# Patient Record
Sex: MALE | Race: WHITE | Employment: FULL TIME | URBAN - METROPOLITAN AREA
[De-identification: names, ages, dates, MRNs, and addresses within clinical notes are randomized per-mention and may not be internally consistent; named-entity substitution may affect disease eponyms.]

---

## 2021-06-02 ENCOUNTER — HOSPITAL ENCOUNTER (OUTPATIENT)
Dept: PHYSICAL THERAPY | Age: 56
Discharge: HOME | End: 2021-06-02
Payer: COMMERCIAL

## 2021-06-02 PROCEDURE — 97162 PT EVAL MOD COMPLEX 30 MIN: CPT | Performed by: PHYSICAL THERAPIST

## 2021-06-02 PROCEDURE — 97530 THERAPEUTIC ACTIVITIES: CPT | Performed by: PHYSICAL THERAPIST

## 2021-06-02 PROCEDURE — 97110 THERAPEUTIC EXERCISES: CPT | Performed by: PHYSICAL THERAPIST

## 2021-06-02 NOTE — PROGRESS NOTES
PT DAILY TREATMENT NOTE/SHOULDER EVAL     Patient Name: Anusha Michaud  Date:2021  : 1965  [x]  Patient  Verified  Payor: [address] [place] [address] Road / Plan: "[employer]re / Product Type: Workers Comp /    In [address]  Total Treatment Time (min): 53  Visit #: 1 of 12    Medicare/BCBS Only   Total Timed Codes (min):  20 1:1 Treatment Time:  53       Treatment Area: No admission diagnoses are documented for this encounter. SUBJECTIVE  Pain Level (0-10 scale): 1 ache occasional throb   []constant []intermittent []improving []worsening []no change since onset    Any medication changes, allergies to medications, adverse drug reactions, diagnosis change, or new procedure performed?: [x] No    [] Yes (see summary sheet for update)  Subjective functional status/changes:     PLOF: no limitations with reaching overhead and with sleeping through night   Limitations to PLOF: pain wakes him up at night couple times a week , pain with overhead activities and reaching behind him . Mechanism of Injury: left shoulder pain s/p fell on ice 2021 on sidewalk , did not hit head fell onto left shoulder then hip knee ( hip knee better but shoulder persists - mil better ) shoulder wakes him up with pain not every night    Current symptoms/Complaints: pain in left shoulder   Other movements reach behind or above   And mild CBA , no instability feel , arm falls asleep when lying down at times     Previous Treatment/Compliance: no therapy in past   PMHx/Surgical Hx: high blood pressure , no surgery in past except hernia   Work Hx: desk job , meeting , travel   Living Situation: lives with wife   Pt Goals: sleep through night , make sure nothing else is wrong .    Barriers: []pain []financial []time []transportation []other  Motivation: good   Substance use: []Alcohol []Tobacco []other:   FABQ Score: []low []elevate  Cognition: A & O x 4    Other:    OBJECTIVE/EXAMINATION  Domestic Life: safe at home Activity/Recreational Limitations: difficult to sleep , might do less reach overhead and behind     Mobility: generally good   Self Care: indep         33 min [x]Eval                  []Re-Eval       12 min Therapeutic Exercise:  [x] See flow sheet :   Rationale: increase ROM, increase strength, improve coordination and increase proprioception to improve the patients ability to PLOF     8 min Therapeutic Activity:  [x]  See flow sheet : discussed findings of eval , answered questions , discussed taught posture and connection between shoulder and thoracic movement    Rationale: increase ROM, increase strength and improve coordination  to improve the patients ability to return to PLOF            With   [] TE   [] TA   [] neuro   [] other: Patient Education: [x] Review HEP    [] Progressed/Changed HEP based on:   [] positioning   [] body mechanics   [] transfers   [] heat/ice application    [] other:      Other Objective/Functional Measures: FOTO : 65/100     Physical Therapy Evaluation - Shoulder    Posture: [] Poor    [x] Fair    [] Good    Describe: = inferior angle , = arm space , mild forward shoulder head posture      ttp over RTC insert , no other tenderness noted , no pain over bicep tendon . ROM & strenght :  [] Unable to assess at this time                                           AROM                                    Strength    Left Right  Left Right   Flexion 141 +pain  165 Flexion  3+ to 4- 5   Extension 68 end range pain  78 Extension 5 5   Scaption/ End range pain   180 Scaptin/ABD 4- 4+   ER @ 0 Degrees Reach mild tighter   ER @ 0 Degrees 4 4+   ER @ 90 Degrees   ADD 5 5   IR @ 90 Degrees T9  No sig pain T8 IR @ 90 Degrees 5 5     End Feel / Painful Arc:end range pain       Scapulohumoral Control / Rhythm:  Able to eccentrically lower with good control?  Left: [x] Yes   [] No     Right: [x] Yes   [] No    Accessory Motions: no     Optional Tests:  Reflexes and sensation  UE WNL bilaterally     Positive orozco reji and CBA with elevation on left  + pain ,  + neer test     NEGative wilkins + impingement , neg for labrum (  Not deep in joint and worse with thumb up )     Neg for dynamic labral sheer , neg for bicep load test     No instability bilateral shoulders/ neg apprehension ,  Positive  tight inferior capsule bilaterally   Very tight IR bilaterally pt supine tested in 90 deg abd     Mild forward head and shoulder posture     Other Tests / Comments: HEP performed   Repeated mobility ex with TMR ( total motion release )   Flexion increase to 165 deg still end range pain on left following repeated flexion 2 sets 10 x 5 sec on right / after switching to 3x 20 sec hold but only mild increase to 167 deg on right following       167 deg left shoulder abd following flexion repeated motions, following abd on right shoulder 10x5 sec noted left now 173 deg abd     Sitting trunk rotation left rotation only mild stiffer and some discomfort in shoulder on return   Pt performed        Discussed posture and eval findings     Pain Level (0-10 scale) post treatment: 1 less tightness     ASSESSMENT/Changes in Function: Patient is a 60 y.o.male who presents to PT with Left shoulder pain [M25.512] s/p fall on to left shoulder. Today noting + impingement tests with kallie Beverly . Neg for labral test with dynamic labral sheer , bicep load and wilkins. + Positive  weakness left shoulder with some pain with resist. Decrease ROM left UE compared to right . Neg drop arm. TTP over RTC insert only. No instability mild tight inferior glide and + IR tightness at 90 deg. The patient will benefit from skilled PT services to address these deficits and facilitate return to premorbid activity level and improved quality of life.     Patient will continue to benefit from skilled PT services to modify and progress therapeutic interventions, address functional mobility deficits, address ROM deficits, address strength deficits, analyze and address soft tissue restrictions, analyze and cue movement patterns, analyze and modify body mechanics/ergonomics, assess and modify postural abnormalities and address imbalance/dizziness to attain remaining goals. [x]  See Plan of Care  []  See progress note/recertification  []  See Discharge Summary         Progress towards goals / Updated goals:  Short Term Goals: To be accomplished in 4 weeks:   Patient will report compliance with initial/basic HEP at least 1x/day to aid in rehabilitation program.   Status at IE: Patient instructed in and provided written copy of initial Home Exercise Program.   Current: Same as IE     Patient will display full left shoulder pain free AROM into flexion,extension  and abduction to aid in completion of ADLs. Status at IE:    Left Right   Flexion 141 +pain  165   Extension 68 end range pain  78   Scaption/ End range pain   180      Current: Same as IE      Long Term Goals: To be accomplished in 8 weeks:   Patient will report compliance with comprehensive HEP a least 3-4x/week to aid in rehabilitation/strengthening program.   Status at IE: Patient instructed in and provided written copy of initial Home Exercise Program.   Current: Same as IE     Patient will report no pain greater than 1-2/10 with overhead activities to aid in completion of ADLs and able to sleep through night without shoulder pain waking him up    Status at IE: 4/10   Current: Same as IE     Patient will increase bilateral   UE strength to 5/5 throughout all planes to aid in completion of ADLs. Status at IE:   Left Right   Flexion  3+ to 4- 5   Extension 5 5   Scaptin/ABD 4- 4+   ER @ 0 Degrees 4 4+   ADD 5 5   IR @ 90 Degrees 5 5       Current: Same as IE     Patient will increase FOTO (an established functional score where 100 = no disability) score to 69 points overall to demonstrate improvement in functional status.     Status at IE: 72   Current: Same as IE      PLAN  [x]  Upgrade activities as tolerated     [x]  Continue plan of care  []  Update interventions per flow sheet       []  Discharge due to:_  []  Other:_      David Kelly, PT 6/2/2021  2:06 PM

## 2021-06-02 NOTE — PROGRESS NOTES
[facility] at [address] [place] [address]  Phone: [phone]   Fax: [phone]    Plan of Care/ Statement of Necessity for Physical Therapy Services     Patient name: Kristi Xiong Start of Care: 2021   Referral source: [facility], Not On File, NP : 1965    Medical Diagnosis: Left shoulder pain [M25.512]   Onset Date:2021     Treatment Diagnosis: left shoulder pain    Prior Hospitalization: see medical history Provider#: [id number]   Medications: Verified on Patient summary List    Comorbidities: high blood pressure    Prior Level of Function: no limitations in reaching behind or overhead , able to sleep through night without shoulder pain waking him up . The Plan of Care and following information is based on the information from the initial evaluation. Assessment/ key information: a 60 y.o.male who presents to PT with Left shoulder pain [M25.512] s/p fall on to left shoulder. Today noting + impingement tests with kallie Medina . Neg for labral test with dynamic labral sheer , bicep load and wilkins. + Positive  weakness left shoulder with some pain with resist. Decrease ROM left UE compared to right . Neg drop arm. TTP over RTC insert only. No instability mild tight inferior glide and + IR tightness at 90 deg. The patient will benefit from skilled PT services to address these deficits and facilitate return to premorbid activity level and improved quality of life.     Evaluation Complexity History MEDIUM  Complexity : 1-2 comorbidities / personal factors will impact the outcome/ POC ; Examination HIGH Complexity : 4+ Standardized tests and measures addressing body structure, function, activity limitation and / or participation in recreation  ;Presentation MEDIUM Complexity : Evolving with changing characteristics  ; Clinical Decision Making MEDIUM Complexity : FOTO score of 26-74  Overall Complexity Rating: MEDIUM  Problem List: pain affecting function, decrease ROM, decrease strength, decrease activity tolerance and decrease flexibility/ joint mobility   Treatment Plan may include any combination of the following: Therapeutic exercise, Therapeutic activities, Neuromuscular re-education, Physical agent/modality, Manual therapy, Aquatic therapy and Patient education  Patient / Family readiness to learn indicated by: asking questions, trying to perform skills and interest  Persons(s) to be included in education: patient (P)  Barriers to Learning/Limitations: None  Measures taken if barriers to learning: NA  Patient Goal (s): sleep through the night , address root problem.   Patient Self Reported Health Status: good  Rehabilitation Potential: excellent  Short Term Goals: To be accomplished in 4 weeks:                   Patient will report compliance with initial/basic HEP at least 1x/day to aid in rehabilitation program.                   Status at IE: Patient instructed in and provided written copy of initial Home Exercise Program.                   Current: Same as IE                      Patient will display full left shoulder pain free AROM into flexion,extension  and abduction to aid in completion of ADLs. Status at IE:     Left Right   Flexion 141 +pain  165   Extension 68 end range pain  78   Scaption/ End range pain   180                       Current: Same as IE        Long Term Goals:  To be accomplished in 8 weeks:                   Patient will report compliance with comprehensive HEP a least 3-4x/week to aid in rehabilitation/strengthening program.                   Status at IE: Patient instructed in and provided written copy of initial Home Exercise Program.                   Current: Same as IE                      Patient will report no pain greater than 1-2/10 with overhead activities to aid in completion of ADLs and able to sleep through night without shoulder pain waking him up                    Status at IE: 4/10                   Current: Same as IE                      Patient will increase bilateral   UE strength to 5/5 throughout all planes to aid in completion of ADLs. Status at IE:    Left Right   Flexion  3+ to 4- 5   Extension 5 5   Scaptin/ABD 4- 4+   ER @ 0 Degrees 4 4+   ADD 5 5   IR @ 90 Degrees 5 5                       Current: Same as IE                      Patient will increase FOTO (an established functional score where 100 = no disability) score to 69 points overall to demonstrate improvement in functional status. Status at IE: 72                   Current: Same as IE     Frequency / Duration: Patient to be seen 2 times per week for 6 weeks. Patient/ Caregiver education and instruction: Diagnosis, prognosis, self care, activity modification and exercises   [x]  Plan of care has been reviewed with HOA Kelly, PT 6/2/2021 3:19 PM  _____________________________________________________________________  I certify that the above Therapy Services are being furnished while the patient is under my care. I agree with the treatment plan and certify that this therapy is necessary.     [de-identified] Signature:____________Date:_________TIME:________                                      Rowena Legacy, Not On File, NP    ** Signature, Date and Time must be completed for valid certification **    Please sign and return to [facility] at [address]  Phone: [phone]   Fax: [phone]

## 2021-06-04 ENCOUNTER — HOSPITAL ENCOUNTER (OUTPATIENT)
Dept: PHYSICAL THERAPY | Age: 56
Discharge: HOME | End: 2021-06-04
Payer: COMMERCIAL

## 2021-06-04 PROCEDURE — 97110 THERAPEUTIC EXERCISES: CPT | Performed by: PHYSICAL THERAPIST

## 2021-06-04 NOTE — PROGRESS NOTES
PT DAILY TREATMENT NOTE    Patient Name: Kori Marrero  WXVI:3/9/2176  : 1965  [x]  Patient  Verified  Payor: [address] [place] [address] Road / Plan: [facility] / Product Type: Workers Comp /    In [employer] time:0850  Total Treatment Time (min): 50  Total Timed Codes (min): 50     Visit #: 2 of 12    Treatment Area: Left shoulder pain [M25.512]    SUBJECTIVE  Pain Level (0-10 scale): 1sore only   Any medication changes, allergies to medications, adverse drug reactions, diagnosis change, or new procedure performed?: [x] No    [] Yes (see summary sheet for update)  Subjective functional status/changes:   [x] No changes reported      OBJECTIVE  45 min Therapeutic Exercise:  [x]? See flow sheet :   Rationale: increase ROM, increase strength, improve coordination and increase proprioception to improve the patients ability to PLOF        5 min Manual Therapy:  pec minor release    Rationale: decrease pain, increase ROM, increase tissue extensibility, decrease trigger points and increase postural awareness to improve the patients pain free functional mobility   The manual therapy interventions were performed at a separate and distinct time from the therapeutic activities interventions.           With   [] TE   [] TA   [] neuro   [] other: Patient Education: [x] Review HEP    [] Progressed/Changed HEP based on:   [] positioning   [] body mechanics   [] transfers   [] heat/ice application    [] other:      Other Objective/Functional Measures:   Flexion 160 deg left end range pain , right 170 deg   Pt performed repeated mobility with non painful side then recheck affect on opp side 1x 10 reps then 6 x 20 sec hold  ROM improved to 167 deg but still pop on return     abd 173 deg pain up and on return left , 180 right no pain 6x20 sec holds on right then recheck left noted increase ROM to 176 but still pain both directions     Trunk rotation right 62 deg , left 52 deg but feels = to pt no pain, performed 10 x 5 reps to right then recheck left ROM     Manual : pec minor release pt noted less return pain in flexion after     Pain Level (0-10 scale) post treatment: 0    ASSESSMENT/Changes in Function: tolerated ex well , cues for posture , pt has started repeated mobility ex with total motion release concept  with HEP since eval and today noted increase ROM in left shoulder since eval . No adverse affect with therapy . Patient will continue to benefit from skilled PT services to modify and progress therapeutic interventions, address functional mobility deficits, address ROM deficits, address strength deficits, analyze and address soft tissue restrictions, analyze and cue movement patterns, analyze and modify body mechanics/ergonomics and assess and modify postural abnormalities to attain remaining goals. [x]  See Plan of Care  []  See progress note/recertification  []  See Discharge Summary         Progress towards goals / Updated goals:  Short Term Goals: To be accomplished in 4 weeks:                   SANTI will report compliance with initial/basic HEP at least 1x/day to aid in rehabilitation program.                   Status at IE: Patient instructed in and provided written copy of initial Home Exercise Program.                   Current: reviewed ex given and added ex to program 6/4/2021                       Patient will display full left shoulder pain free AROM into flexion,extension  and abduction to aid in completion of ADLs.                  Status at IE:     Left Right   Flexion 141 +pain  165   Extension 68 end range pain  78   Scaption/ End range pain   180                       Current:  Left shoulder  flexion pain on return and 173 abd some pain with both directions.  , progressing 6/4/2021          Long Term Goals: To be accomplished in 8 weeks:                   RAFAEL will report compliance with comprehensive HEP a least 3-4x/week to aid in rehabilitation/strengthening program.                   Status at IE: Patient instructed in and provided written copy of initial Home Exercise Program.                   Current: Same as IE                      Patient will report no pain greater than 1-2/10 with overhead activities to aid in completion of ADLs and able to sleep through night without shoulder pain waking him up                    Status at IE: 4/10                   Current: Same as IE                      Patient will increase bilateral   UE strength to 5/5 throughout all planes to aid in completion of ADLs.                  Status at IE:    Left Right   Flexion  3+ to 4- 5   Extension 5 5   Scaptin/ABD 4- 4+   ER @ 0 Degrees 4 4+   ADD 5 5   IR @ 90 Degrees 5 5                       Current: Same as IE                      KHHNJOQ will increase FOTO (an established functional score where 100 = no disability) score to 69 points overall to demonstrate improvement in functional status.                     Status at IE: 65                   Current: Same as IE      PLAN  [x]  Upgrade activities as tolerated     [x]  Continue plan of care  []  Update interventions per flow sheet       []  Discharge due to:_  []  Other:_      Tameka Joya PT 6/4/2021  8:01 AM    Future Appointments   Date Time Provider Eugenio Wallace   6/8/2021  9:30 AM JULY Montaño [facility]   6/10/2021  8:00 AM JULY Edge [facility]   6/15/2021 11:00 AM JULY Montaño [facility]   6/17/2021  2:30 PM JULY Montaño [facility]

## 2021-06-08 ENCOUNTER — HOSPITAL ENCOUNTER (OUTPATIENT)
Dept: PHYSICAL THERAPY | Age: 56
Discharge: HOME | End: 2021-06-08
Payer: COMMERCIAL

## 2021-06-08 PROCEDURE — 97530 THERAPEUTIC ACTIVITIES: CPT

## 2021-06-08 PROCEDURE — 97110 THERAPEUTIC EXERCISES: CPT

## 2021-06-08 NOTE — PROGRESS NOTES
PT DAILY TREATMENT NOTE   Patient Name: Claire Mccarthy  Date:2021  : 1965  [x]  Patient  Verified  Payor: [facility] / Plan: [employer] / Product Type: Workers Comp /    In [place] time:1015  Total Treatment Time (min): 46  Total Timed Codes (min): 46   1:1 Treatment Time ( only): 55   Visit #: 3 of 12    Treatment Area: Left shoulder pain [M25.512]    SUBJECTIVE  Pain Level (0-10 scale): 0  Any medication changes, allergies to medications, adverse drug reactions, diagnosis change, or new procedure performed?: [x] No    [] Yes (see summary sheet for update)  Subjective functional status/changes:   [] No changes reported    Patient reports that he is compliant with initial HEP. OBJECTIVE    35 min Therapeutic Exercise:  [x] See flow sheet :   Rationale: increase ROM, increase strength and decrease pain to improve the patients ability to complete ADLs    11 min Therapeutic Activity:  [x]  See flow sheet :   Rationale: increase ROM, increase strength and improve coordination  to improve the patients ability to complete ADLs         With   [x] TE   [] TA   [] neuro   [] other: Patient Education: [x] Review HEP    [] Progressed/Changed HEP based on:   [] positioning   [] body mechanics   [] transfers   [] heat/ice application    [] other:      Other Objective/Functional Measures: NA     Pain Level (0-10 scale) post treatment: 0    ASSESSMENT/Changes in Function: Patient responds well to treatment session. Patient responded well to [facility] press and bicep curl reporting decreased tightness with shoulder extension. He also reported a reduction in symptoms with cervical retraction. Will advance HEP next visit.  No adverse effects were noted from today's treatment session    Patient will continue to benefit from skilled PT services to modify and progress therapeutic interventions, address functional mobility deficits, address ROM deficits, address strength deficits, analyze and address soft tissue restrictions, analyze and cue movement patterns, analyze and modify body mechanics/ergonomics, assess and modify postural abnormalities, and instruct in home and community integration to attain remaining goals. []  See Plan of Care  []  See progress note/recertification  []  See Discharge Summary         Progress towards goals / Updated goals:  Short Term Goals: To be accomplished in 4 weeks:                   KTURRGK will report compliance with initial/basic HEP at least 1x/day to aid in rehabilitation program.                   Status at IE: Patient instructed in and provided written copy of initial Home Exercise Program.                   WGXEMAD:MFLAOAIC HEP, 6/8/2021                      Patient will display full left shoulder pain free AROM into flexion,extension  and abduction to aid in completion of ADLs.                  Status at IE:     Left Right   Flexion 141 +pain  165   Extension 68 end range pain  78   Scaption/ End range pain   180                       Current:  Left shoulder  flexion pain on return and 173 abd some pain with both directions. , progressing 6/4/2021          Long Term Goals: To be accomplished in 8 weeks:                   Patient will report compliance with comprehensive HEP a least 3-4x/week to aid in rehabilitation/strengthening program.                   Status at IE: Patient instructed in and provided written copy of initial Home Exercise Program.                   Current: Same as IE                      Patient will report no pain greater than 1-2/10 with overhead activities to aid in completion of ADLs and able to sleep through night without shoulder pain waking him up                    Status at IE: 4/10                   Current: Same as IE                      Patient will increase bilateral   UE strength to 5/5 throughout all planes to aid in completion of ADLs.                    Status at IE:    Left Right   Flexion  3+ to 4- 5   Extension 5 5 Scaptin/ABD 4- 4+   ER @ 0 Degrees 4 4+   ADD 5 5   IR @ 90 Degrees 5 5                       Current: Same as IE                      KGPEOBU will increase FOTO (an established functional score where 100 = no disability) score to 69 points overall to demonstrate improvement in functional status.                     Status at IE: 65                   Current: Same as IE    PLAN  []  Upgrade activities as tolerated     [x]  Continue plan of care  []  Update interventions per flow sheet       []  Discharge due to:_  []  Other:_      Easton Romano, PT, DPT 6/8/2021  9:51 AM    Future Appointments   Date Time Provider Eugenio Wallace   6/10/2021  8:00 AM Gasper Sauer, PT KYM [facility]   6/15/2021 11:00 AM JULY Kramer [facility]   6/17/2021  2:30 PM JULY Kramer [facility]

## 2021-06-10 ENCOUNTER — HOSPITAL ENCOUNTER (OUTPATIENT)
Dept: PHYSICAL THERAPY | Age: 56
Discharge: HOME | End: 2021-06-10
Payer: COMMERCIAL

## 2021-06-10 PROCEDURE — 97110 THERAPEUTIC EXERCISES: CPT | Performed by: PHYSICAL THERAPIST

## 2021-06-10 PROCEDURE — 97530 THERAPEUTIC ACTIVITIES: CPT | Performed by: PHYSICAL THERAPIST

## 2021-06-10 NOTE — PROGRESS NOTES
PT DAILY TREATMENT NOTE    Patient Name: Jessica Tobin  Date:6/10/2021  : 1965  [x]  Patient  Verified  Payor: [facility] / Plan: Meghan Horn / Product Type: Workers Comp /    [employer] [id number]  Total Treatment Time (min): 52  Total Timed Codes (min): 52     Visit #: 4 of 12    Treatment Area: Left shoulder pain [M25.512]    SUBJECTIVE  Pain Level (0-10 scale): 0  Any medication changes, allergies to medications, adverse drug reactions, diagnosis change, or new procedure performed?: [x] No    [] Yes (see summary sheet for update)  Subjective functional status/changes:   [] No changes reported  Sore after last session not painful felt like a new ex type soreness only . yesterday received immunization for shingles and tetanus and due to shots slept most of the day     OBJECTIVE  37 min Therapeutic Exercise:  [x]? See flow sheet :   Rationale: increase ROM, increase strength and decrease pain to improve the patients ability to complete ADLs     15 min Therapeutic Activity:  [x]?   See flow sheet :   Rationale: increase ROM, increase strength and improve coordination  to improve the patients ability to complete ADLs          With   [] TE   [] TA   [] neuro   [] other: Patient Education: [x] Review HEP    [] Progressed/Changed HEP based on:   [] positioning   [] body mechanics   [] transfers   [] heat/ice application    [] other:      Other Objective/Functional Measures:  Reviewed total motion release concept utilizing pain free side to promote improved mobility on opposite painful side     abd 164 deg pain up and on return left , 180 right no pain (initial ROM ) , pt performed  10 x 5 sec then  6x20 sec holds on right then recheck left periodically  noted increase ROM to 173 and less pain ( rechecked flexion as well and noted improved ROM left from 158 to 167 deg )      Flexion 158 deg left end range pain ( increased to 167 following abd )  , right 173 deg   Pt performed repeated mobility with non painful side then recheck affect on opp side  6 x 20 sec hold  ROM improved to 172  deg only very small pop on return still .      Sitting trunk rotation appears =     Recheck MMT   Flexion 4+/5 no pain   ER 4+/5   abd pain 4-/5    Pain Level (0-10 scale) post treatment: 0    ASSESSMENT/Changes in Function: pt ROM and strength progressing and noted less pain , still some popping noted and still wakes him at night at times. CUes with ex and posture . No adverse affect with therapy . Patient will continue to benefit from skilled PT services to modify and progress therapeutic interventions, address functional mobility deficits, address ROM deficits, address strength deficits, analyze and address soft tissue restrictions, analyze and cue movement patterns, analyze and modify body mechanics/ergonomics and assess and modify postural abnormalities to attain remaining goals. [x]  See Plan of Care  []  See progress note/recertification  []  See Discharge Summary         Progress towards goals / Updated goals:  Short Term Goals: To be accomplished in 4 weeks:                   HBCLZQJ will report compliance with initial/basic HEP at least 1x/day to aid in rehabilitation program.                   Status at IE: Patient instructed in and provided written copy of initial Home Exercise Program.                   Current: reviewed ex given and added ex to program 6/4/2021                       Patient will display full left shoulder pain free AROM into flexion,extension  and abduction to aid in completion of ADLs.                  Status at IE:     Left Right   Flexion 141 +pain  165   Extension 68 end range pain  78   Scaption/ End range pain   180                       Current:  Left shoulder ROM end of session  167 flexion pain on return and 173 abd some pain with both directions.  , progressing 6/10/2021          Long Term Goals: To be accomplished in 8 weeks:                   SCKGJTF will report compliance with comprehensive HEP a least 3-4x/week to aid in rehabilitation/strengthening program.                   Status at IE: Patient instructed in and provided written copy of initial Home Exercise Program.                   Current: progressing toward indep with HEP                       Patient will report no pain greater than 1-2/10 with overhead activities to aid in completion of ADLs and able to sleep through night without shoulder pain waking him up                    Status at IE: 4/10                   Current: still 2 x this week woke up due to shoulder pain up to 2/10 progressing 6/10/21                       Patient will increase bilateral   UE strength to 5/5 throughout all planes to aid in completion of ADLs.                  Status at IE:    Left Right   Flexion  3+ to 4- 5   Extension 5 5   Scaptin/ABD 4- 4+   ER @ 0 Degrees 4 4+   ADD 5 5   IR @ 90 Degrees 5 5                       Current: Flexion 4+/5 no pain, ER 4+ to 5- /5   abd pain 4-/5, progressing 6/10 /2021                       Patient will increase FOTO (an established functional score where 100 = no disability) score to 69 points overall to demonstrate improvement in functional status.                     Status at IE: 65                   Current: Same as IE      PLAN  [x]  Upgrade activities as tolerated     [x]  Continue plan of care  []  Update interventions per flow sheet       []  Discharge due to:_  []  Other:_      Rossy Medel PT 6/10/2021  8:06 AM    Future Appointments   Date Time Provider Eugenio Wallace   6/15/2021 11:00 AM JULY Rincon [facility]   6/17/2021  2:30 PM JULY Rincon [facility]

## 2021-06-15 ENCOUNTER — HOSPITAL ENCOUNTER (OUTPATIENT)
Dept: PHYSICAL THERAPY | Age: 56
Discharge: HOME | End: 2021-06-15
Payer: COMMERCIAL

## 2021-06-15 PROCEDURE — 97110 THERAPEUTIC EXERCISES: CPT

## 2021-06-15 PROCEDURE — 97530 THERAPEUTIC ACTIVITIES: CPT

## 2021-06-15 NOTE — PROGRESS NOTES
PT DAILY TREATMENT NOTE    Patient Name: Savannah Nyhan  Date:6/15/2021  : 1965  [x]  Patient  Verified  Payor: [facility] / Plan: Karla Flannery / Product Type: Workers Comp /    In Halina Grijalva  Total Treatment Time (min): 53  Total Timed Codes (min): 53     Visit #: 6 of 12    Treatment Area: Left shoulder pain [M25.512]    SUBJECTIVE  Pain Level (0-10 scale): 1  Any medication changes, allergies to medications, adverse drug reactions, diagnosis change, or new procedure performed?: [x] No    [] Yes (see summary sheet for update)  Subjective functional status/changes:   [] No changes reported  Patient reports that he has shoulder stiffness and soreness today. OBJECTIVE    45 min Therapeutic Exercise:  [x] See flow sheet :   Rationale: increase ROM, increase strength and decrease pain to improve the patients ability to complete ADLs    8 min Therapeutic Activity:  [x]  See flow sheet :   Rationale: increase ROM, increase strength and improve coordination  to improve the patients ability to complete ADLs           With   [x] TE   [] TA   [] neuro   [] other: Patient Education: [x] Review HEP    [] Progressed/Changed HEP based on:   [] positioning   [] body mechanics   [] transfers   [] heat/ice application    [] other:      Other Objective/Functional Measures: NA     Pain Level (0-10 scale) post treatment: 0    ASSESSMENT/Changes in Function: Patient responds well to treatment session as he had reduced pain and stiffness with shoulder IR isometric. He required cues to recall exercise parameters. Added exercise to HEP. Will reassess patient next visit.  No adverse effects were noted from today's treatment session    Patient will continue to benefit from skilled PT services to modify and progress therapeutic interventions, address functional mobility deficits, address ROM deficits, address strength deficits, analyze and address soft tissue restrictions, analyze and cue movement patterns, analyze and modify body mechanics/ergonomics, assess and modify postural abnormalities, and instruct in home and community integration to attain remaining goals. []  See Plan of Care  []  See progress note/recertification  []  See Discharge Summary         Progress towards goals / Updated goals:  Short Term Goals: To be accomplished in 4 weeks:                   JCIIYZX will report compliance with initial/basic HEP at least 1x/day to aid in rehabilitation program.                   Status at IE: Patient instructed in and provided written copy of initial Home Exercise Program.                   KBEZQDF: JELCXHLG ex given and added ex to program 6/4/2021                       Patient will display full left shoulder pain free AROM into flexion,extension  and abduction to aid in completion of ADLs.                  Status at IE:     Left Right   Flexion 141 +pain  165   Extension 68 end range pain  78   Scaption/ End range pain   180                       Current:  Left shoulder ROM end of session  167 flexion pain on return and 173 abd some pain with both directions.  , progressing 6/10/2021          Long Term Goals: To be accomplished in 8 weeks:                   UWVAGYU will report compliance with comprehensive HEP a least 3-4x/week to aid in rehabilitation/strengthening program.                   Status at IE: Patient instructed in and provided written copy of initial Home Exercise Program.                   Current: progressing toward indep with HEP                       Patient will report no pain greater than 1-2/10 with overhead activities to aid in completion of ADLs and able to sleep through night without shoulder pain waking him up                    Status at IE: 4/10                   Current: still 2 x this week woke up due to shoulder pain up to 2/10 progressing 6/10/21                       Patient will increase bilateral   UE strength to 5/5 throughout all planes to aid in completion of ADLs.                   Status at IE:    Left Right   Flexion  3+ to 4- 5   Extension 5 5   Scaptin/ABD 4- 4+   ER @ 0 Degrees 4 4+   ADD 5 5   IR @ 90 Degrees 5 5                       Current: Flexion 4+/5 no pain, ER 4+ to 5- /5   abd pain 4-/5, progressing 6/10 /2021                       Patient will increase FOTO (an established functional score where 100 = no disability) score to 69 points overall to demonstrate improvement in functional status.                     Status at IE: 65                   Current: Same as IE      PLAN  []  Upgrade activities as tolerated     [x]  Continue plan of care  []  Update interventions per flow sheet       []  Discharge due to:_  []  Other:_      Jose Garcia PT, DPT 6/15/2021  10:09 AM    Future Appointments   Date Time Provider Eugenio Wallace   6/15/2021 11:00 AM JULY Blackwell [facility]   6/17/2021  2:30 PM JULY Blackwell [facility]

## 2021-06-17 ENCOUNTER — HOSPITAL ENCOUNTER (OUTPATIENT)
Dept: PHYSICAL THERAPY | Age: 56
Discharge: HOME | End: 2021-06-17
Payer: COMMERCIAL

## 2021-06-17 PROCEDURE — 97110 THERAPEUTIC EXERCISES: CPT

## 2021-06-17 PROCEDURE — 97530 THERAPEUTIC ACTIVITIES: CPT

## 2021-06-17 NOTE — PROGRESS NOTES
PT DAILY TREATMENT NOTE     Patient Name: Adri Marie  Date:2021  : 1965  [x]  Patient  Verified  Payor: [address] [place] [address] Road / Plan: Vaibhav Severe / Product Type: Workers Comp /    In [address] [place] [address]  Total Treatment Time (min): 57  Total Timed Codes (min): 57   Visit #: 6 of 12    Treatment Area: Left shoulder pain [M25.512]    SUBJECTIVE  Pain Level (0-10 scale): 2  Any medication changes, allergies to medications, adverse drug reactions, diagnosis change, or new procedure performed?: [x] No    [] Yes (see summary sheet for update)  Subjective functional status/changes:   [] No changes reported    Patient reports that he is improving with skilled physical therapy. He states that he continues to have left shoulder pain but is having less apprehension with movement. OBJECTIVE    45 min Therapeutic Exercise:  [x] See flow sheet :   Rationale: increase ROM, increase strength and decrease pain to improve the patients ability to complete ADLs    12 min Therapeutic Activity:  [x]  See flow sheet :   Rationale: increase ROM, increase strength and improve coordination  to improve the patients ability to complete ADLs           With   [x] TE   [] TA   [] neuro   [] other: Patient Education: [x] Review HEP    [] Progressed/Changed HEP based on:   [] positioning   [] body mechanics   [] transfers   [] heat/ice application    [] other:      Other Objective/Functional Measures:     FOTO 65  MMT Left shoulder: Flexion 4+/5 no pain, ER 4+ to 5- /5   abduction pain 4-/5,  ROM Left shoulder ROM 167 flexion pain on return and 173 abduction    Pain Level (0-10 scale) post treatment: 0    ASSESSMENT/Changes in Function: Patient responds well to treatment session. No adverse effects were noted from today's treatment session. He is improving as he has met 1/2 STGs and progressing toward his remaining goals. He os compliant with initial HEP.  He is developing strength and functional ROM but is still limited by pain with OH and reaching activities. He responds well to graded loading progressing as he demonstrates gains in functional mobility following exercise activities. Patient will continue to benefit from skilled PT services to modify and progress therapeutic interventions, address functional mobility deficits, address ROM deficits, address strength deficits, analyze and address soft tissue restrictions, analyze and cue movement patterns, analyze and modify body mechanics/ergonomics, assess and modify postural abnormalities, and instruct in home and community integration to attain remaining goals. []  See Plan of Care  []  See progress note/recertification  []  See Discharge Summary         Progress towards goals / Updated goals:  Short Term Goals: To be accomplished in 4 weeks:                   EXQFWRR will report compliance with initial/basic HEP at least 1x/day to aid in rehabilitation program.                   Status at IE: Patient instructed in and provided written copy of initial Home Exercise Program.                   Current: Met patient is compliant initial HEP, 6/17/2021                      Patient will display full left shoulder pain free AROM into flexion,extension  and abduction to aid in completion of ADLs.                  Status at IE:     Left Right   Flexion 141 +pain  165   Extension 68 end range pain  78   Scaption/ End range pain   180                       Current:  Left shoulder ROM end of session  167 flexion pain on return and 173 abd some pain with both directions.  , progressing 6/10/2021          Long Term Goals: To be accomplished in 8 weeks:                   Patient will report compliance with comprehensive HEP a least 3-4x/week to aid in rehabilitation/strengthening program.                   Status at IE: Patient instructed in and provided written copy of initial Home Exercise Program.                   Current:  In-progress, advanced HEP 6/17/2021                      Patient will report no pain greater than 1-2/10 with overhead activities to aid in completion of ADLs and able to sleep through night without shoulder pain waking him up                    Status at IE: 4/10                   Current:  In-progress, 0-2 at rest, 2/10 with OH and reaching activities, 6/17/2021                      Patient will increase bilateral   UE strength to 5/5 throughout all planes to aid in completion of ADLs.                  Status at IE:    Left Right   Flexion  3+ to 4- 5   Extension 5 5   Scaptin/ABD 4- 4+   ER @ 0 Degrees 4 4+   ADD 5 5   IR @ 90 Degrees 5 5                       Current: Flexion 4+/5 no pain, ER 4+ to 5- /5   abd pain 4-/5, progressing 6/10 2021                       Patient will increase FOTO (an established functional score where 100 = no disability) score to 69 points overall to demonstrate improvement in functional status.                  Status at IE: 65                   Current: In-progress, 6/17/2021    PLAN  []  Upgrade activities as tolerated     [x]  Continue plan of care  []  Update interventions per flow sheet       []  Discharge due to:_  []  Other:_      Valerie Mejia PT, DPT 6/17/2021  2:40 PM    No future appointments.

## 2021-06-17 NOTE — PROGRESS NOTES
[facility] at [address] [place] [address] Drive: [phone]   Fax: [phone]  Progress Note  Patient Name: Kori Marrero : 1965   Medical   Diagnosis: Left shoulder pain [M25.512] Treatment Diagnosis: Left shoulder pain   Onset Date: 2021      Referral Source: Danilo Powers PA-C Start of Care [facility]): 2021   Prior Hospitalization: See medical history Provider #: [id number]   Prior Level of Function: no limitations in reaching behind or overhead , able to sleep through night without shoulder pain waking him up   Comorbidities: high blood pressure   Medications: Verified on Patient Summary List      ===========================================================================================  Assessment / Summary of Care:  Kori Marrero is a 60 y.o. male with left shoulder pain secondary to fall on shoulder. He is improving as he has met 1/2 STGs and progressing toward his remaining goals. He os compliant with initial HEP. He is developing strength and functional ROM but is still limited by pain with OH and reaching activities. He responds well to graded loading progressing as he demonstrates gains in functional mobility following exercise activities.  Patient will continue to benefit from skilled PT services to modify and progress therapeutic interventions, address functional mobility deficits, address ROM deficits, address strength deficits, analyze and address soft tissue restrictions, analyze and cue movement patterns, analyze and modify body mechanics/ergonomics, assess and modify postural abnormalities, and instruct in home and community integration to attain remaining goals.     ===========================================================================================    Plan:Continue therapy per initial plan/protocol at a frequency of  2 x per week for 6 weeks    Goals:     Short Term Goals: To be accomplished in 4 weeks:                   Patient will report compliance with initial/basic HEP at least 1x/day to aid in rehabilitation program.                   Status at IE: Patient instructed in and provided written copy of initial Home Exercise Program.                   Current: Met patient is compliant initial HEP, 6/17/2021                      Patient will display full left shoulder pain free AROM into flexion,extension  and abduction to aid in completion of ADLs.                  Status at IE:     Left Right   Flexion 141 +pain  165   Extension 68 end range pain  78   Scaption/ End range pain   180                       Current:  Left shoulder ROM end of session  167 flexion pain on return and 173 abd some pain with both directions. , progressing 6/10/2021          Long Term Goals: To be accomplished in 8 weeks:                   [facility] will report compliance with comprehensive HEP a least 3-4x/week to aid in rehabilitation/strengthening program.                   Status at IE: Patient instructed in and provided written copy of initial Home Exercise Program.                   Current:  In-progress, advanced HEP 6/17/2021                      Patient will report no pain greater than 1-2/10 with overhead activities to aid in completion of ADLs and able to sleep through night without shoulder pain waking him up                    Status at IE: 4/10                   Current:  In-progress, 0-2 at rest, 2/10 with OH and reaching activities, 6/17/2021                      Patient will increase bilateral   UE strength to 5/5 throughout all planes to aid in completion of ADLs.                    Status at IE:    Left Right   Flexion  3+ to 4- 5   Extension 5 5   Scaptin/ABD 4- 4+   ER @ 0 Degrees 4 4+   ADD 5 5   IR @ 90 Degrees 5 5                       Current: Flexion 4+/5 no pain, ER 4+ to 5- /5   abd pain 4-/5, progressing 6/10 2021                       Patient will increase FOTO (an established functional score where 100 = no disability) score to 69 points overall to demonstrate improvement in functional status.                  Status at IE: 65                   Current: In-progress, 6/17/2021     ===========================================================================================  Subjective:      Patient reports that he is improving with skilled physical therapy. He states that he continues to have left shoulder pain but is having less apprehension with movement. Objective:   FOTO 65  MMT Left shoulder: Flexion 4+/5 no pain, ER 4+ to 5- /5   abduction pain 4-/5,  ROM Left shoulder ROM 167 flexion pain on return and 173 abduction    Therapist Signature: Sonam Daniels PT, DPT Date: 1/99/2294   Re-Certification: NA Time: 8:34 PM       I certify that the above Therapy Services are being furnished while the patient is under my care. I agree with the treatment plan and certify that this therapy is necessary. [] I have read the above and request that my patient continue as recommended.   [] I have read the above report and request that my patient continue therapy with the following changes/special instructions: ______________________________________  [] I have read the above report and request that my patient be discharged from therapy        Physician's Signature:____________Date:_________TIME:________  ** Signature, Date and Time must be completed for valid certification **    [facility] at [address]  Phone: [phone]   Fax: [phone]

## 2021-08-31 NOTE — PROGRESS NOTES
[facility] at [address] [place] [address] Drive: [phone]   Fax: [phone]  Discharge Summary  Patient Name: Jesus Manuel Stephen : 1965   Medical   Diagnosis: Left shoulder pain [M25.512] Treatment Diagnosis: Left shoulder pain   Onset Date: 2021     Referral Source: Cinda German PA-C [facility] of Care [facility] [place]): 2021   Prior Hospitalization: See medical history Provider #: [id number]   Prior Level of Function: no limitations in reaching behind or overhead , able to sleep through night without shoulder pain waking him up   Comorbidities: high blood pressure   Medications: Verified on Patient Summary List      ===========================================================================================  Assessment / Summary of Care:  Jesus Manuel Stephen is a 60 y.o. male with left shoulder pain secondary to fall on shoulder. Patient has not returned to clinic in 30 days. Unable to perform formal assessment on patient as a result.     ===========================================================================================    Plan: Discharge to [facility].     Goals/Progress Towards Goals: Unable to reassess       ===========================================================================================  Subjective: NA      Objective: NA    Therapist Signature: Lida Andrews PT, DPT Date: 2021     Time: 1:36 PM